# Patient Record
Sex: FEMALE | Race: WHITE | NOT HISPANIC OR LATINO
[De-identification: names, ages, dates, MRNs, and addresses within clinical notes are randomized per-mention and may not be internally consistent; named-entity substitution may affect disease eponyms.]

---

## 2021-02-16 PROBLEM — Z00.00 ENCOUNTER FOR PREVENTIVE HEALTH EXAMINATION: Status: ACTIVE | Noted: 2021-02-16

## 2021-02-17 ENCOUNTER — APPOINTMENT (OUTPATIENT)
Dept: INFECTIOUS DISEASE | Facility: CLINIC | Age: 66
End: 2021-02-17
Payer: MEDICARE

## 2021-02-17 VITALS
OXYGEN SATURATION: 97 % | DIASTOLIC BLOOD PRESSURE: 82 MMHG | TEMPERATURE: 98.8 F | SYSTOLIC BLOOD PRESSURE: 140 MMHG | HEART RATE: 84 BPM

## 2021-02-17 DIAGNOSIS — Z87.01 PERSONAL HISTORY OF PNEUMONIA (RECURRENT): ICD-10-CM

## 2021-02-17 DIAGNOSIS — Z87.19 PERSONAL HISTORY OF OTHER DISEASES OF THE DIGESTIVE SYSTEM: ICD-10-CM

## 2021-02-17 DIAGNOSIS — Z86.59 PERSONAL HISTORY OF OTHER MENTAL AND BEHAVIORAL DISORDERS: ICD-10-CM

## 2021-02-17 DIAGNOSIS — F43.23 ADJUSTMENT DISORDER WITH MIXED ANXIETY AND DEPRESSED MOOD: ICD-10-CM

## 2021-02-17 DIAGNOSIS — Z88.9 ALLERGY STATUS TO UNSPECIFIED DRUGS, MEDICAMENTS AND BIOLOGICAL SUBSTANCES: ICD-10-CM

## 2021-02-17 DIAGNOSIS — M21.612 BUNION OF LEFT FOOT: ICD-10-CM

## 2021-02-17 DIAGNOSIS — Z86.2 PERSONAL HISTORY OF DISEASES OF THE BLOOD AND BLOOD-FORMING ORGANS AND CERTAIN DISORDERS INVOLVING THE IMMUNE MECHANISM: ICD-10-CM

## 2021-02-17 DIAGNOSIS — Z86.79 PERSONAL HISTORY OF OTHER DISEASES OF THE CIRCULATORY SYSTEM: ICD-10-CM

## 2021-02-17 DIAGNOSIS — Z86.010 PERSONAL HISTORY OF COLONIC POLYPS: ICD-10-CM

## 2021-02-17 DIAGNOSIS — Z85.828 PERSONAL HISTORY OF OTHER MALIGNANT NEOPLASM OF SKIN: ICD-10-CM

## 2021-02-17 DIAGNOSIS — Z87.39 PERSONAL HISTORY OF OTHER DISEASES OF THE MUSCULOSKELETAL SYSTEM AND CONNECTIVE TISSUE: ICD-10-CM

## 2021-02-17 DIAGNOSIS — Z85.820 PERSONAL HISTORY OF MALIGNANT MELANOMA OF SKIN: ICD-10-CM

## 2021-02-17 DIAGNOSIS — Z86.69 PERSONAL HISTORY OF OTHER DISEASES OF THE NERVOUS SYSTEM AND SENSE ORGANS: ICD-10-CM

## 2021-02-17 PROCEDURE — 99072 ADDL SUPL MATRL&STAF TM PHE: CPT

## 2021-02-17 PROCEDURE — 99214 OFFICE O/P EST MOD 30 MIN: CPT

## 2021-02-17 RX ORDER — UBIDECARENONE/VIT E ACET 100MG-5
CAPSULE ORAL
Refills: 0 | Status: ACTIVE | COMMUNITY

## 2021-02-17 RX ORDER — ESCITALOPRAM OXALATE 20 MG/1
TABLET ORAL
Refills: 0 | Status: ACTIVE | COMMUNITY

## 2021-02-17 RX ORDER — AMLODIPINE BESYLATE 5 MG/1
TABLET ORAL
Refills: 0 | Status: ACTIVE | COMMUNITY

## 2021-02-17 RX ORDER — PANTOPRAZOLE SODIUM 20 MG/1
20 TABLET, DELAYED RELEASE ORAL
Refills: 0 | Status: ACTIVE | COMMUNITY

## 2021-02-17 NOTE — ASSESSMENT
[FreeTextEntry1] : 66 y/o woman with h/o HTN, GERD,  recurrent diverticulitis , s/p recent admission with third bout of recurrent diverticulitis with microperforation \par She has multiple Abx allergies - hives ad rash - including PCN, Vanco, flagyl, Cephalosporins \par Clinically improved on Ertapenem -- no new complaints \par s/p second dose of COVID vaccine yesterday \par no leukocytosis \par \par REC\par \par 1. Continue Invanz 1g daily x 2 days more through 2/19 then DC PICC line \par 2. Cont low fiber diet \par 3. GI follow up - has an appointment in 2 weeks - due to get a colonoscopy in the near future\par 4. Surgery follow up with Dr. Biswas - d/w patient she will likely need an elective partial colon resection \par 5. follow up prn \par \par plan above d/w patient at length and quesitons answered \par  [Treatment Education] : treatment education [Treatment Adherence] : treatment adherence [Drug Interactions / Side Effects] : drug interactions/side effects

## 2021-02-17 NOTE — PHYSICAL EXAM
[General Appearance - Alert] : alert [General Appearance - In No Acute Distress] : in no acute distress [Sclera] : the sclera and conjunctiva were normal [PERRL With Normal Accommodation] : pupils were equal in size, round, reactive to light [Extraocular Movements] : extraocular movements were intact [Outer Ear] : the ears and nose were normal in appearance [Oropharynx] : the oropharynx was normal with no thrush [Auscultation Breath Sounds / Voice Sounds] : lungs were clear to auscultation bilaterally [Heart Rate And Rhythm] : heart rate was normal and rhythm regular [Heart Sounds] : normal S1 and S2 [Heart Sounds Gallop] : no gallops [Murmurs] : no murmurs [Heart Sounds Pericardial Friction Rub] : no pericardial rub [Edema] : there was no peripheral edema [FreeTextEntry1] : RUE  PICC line - no erythema or edema, no tenderness  [Bowel Sounds] : normal bowel sounds [Abdomen Soft] : soft [Abdomen Tenderness] : non-tender [Costovertebral Angle Tenderness] : no CVA tenderness [Musculoskeletal - Swelling] : no joint swelling [Motor Tone] : muscle strength and tone were normal [] : no rash [Skin Color & Pigmentation] : normal skin color and pigmentation [Cranial Nerves] : cranial nerves 2-12 were intact [Motor Exam] : the motor exam was normal [No Focal Deficits] : no focal deficits [Oriented To Time, Place, And Person] : oriented to person, place, and time [Affect] : the affect was normal

## 2021-02-17 NOTE — HISTORY OF PRESENT ILLNESS
[FreeTextEntry1] : # 8 Ertapenem \par \par Patient here for follow up - \par goes to [facility] for IV Abx infusion \par feel better \par abdominal pain resolved \par eating low fiber diet \par no fever or chills\par \par received her second COVID vaccine dose yesterday - no complaints \par \par

## 2021-02-17 NOTE — REASON FOR VISIT
[Post Hospitalization] : a post hospitalization visit [FreeTextEntry1] : Pt last seen at [facility] for Diverticulitis , released on Invanz @ [facility].

## 2022-05-09 ENCOUNTER — APPOINTMENT (OUTPATIENT)
Dept: PULMONOLOGY | Facility: CLINIC | Age: 67
End: 2022-05-09
Payer: MEDICARE

## 2022-05-09 VITALS
TEMPERATURE: 97.5 F | DIASTOLIC BLOOD PRESSURE: 84 MMHG | HEART RATE: 94 BPM | HEIGHT: 64 IN | OXYGEN SATURATION: 97 % | SYSTOLIC BLOOD PRESSURE: 122 MMHG | BODY MASS INDEX: 31.76 KG/M2 | WEIGHT: 186 LBS

## 2022-05-09 DIAGNOSIS — K57.32 DIVERTICULITIS OF LARGE INTESTINE W/OUT PERFORATION OR ABSCESS W/OUT BLEEDING: ICD-10-CM

## 2022-05-09 PROCEDURE — 99204 OFFICE O/P NEW MOD 45 MIN: CPT

## 2022-05-09 RX ORDER — DOXYCYCLINE HYCLATE 100 MG/1
100 CAPSULE ORAL
Qty: 14 | Refills: 0 | Status: COMPLETED | COMMUNITY
Start: 2022-04-24 | End: 2022-05-09

## 2022-05-09 RX ORDER — AZITHROMYCIN 250 MG/1
250 TABLET, FILM COATED ORAL
Qty: 6 | Refills: 0 | Status: COMPLETED | COMMUNITY
Start: 2022-05-02

## 2022-05-09 RX ORDER — MUPIROCIN 20 MG/G
2 OINTMENT TOPICAL
Qty: 22 | Refills: 0 | Status: ACTIVE | COMMUNITY
Start: 2022-02-04

## 2022-05-09 RX ORDER — DOXYCYCLINE HYCLATE 100 MG/1
100 TABLET ORAL
Qty: 14 | Refills: 0 | Status: COMPLETED | COMMUNITY
Start: 2022-04-04 | End: 2022-05-09

## 2022-05-09 RX ORDER — MOXIFLOXACIN HYDROCHLORIDE TABLETS, 400 MG 400 MG/1
400 TABLET, FILM COATED ORAL
Qty: 7 | Refills: 0 | Status: COMPLETED | COMMUNITY
Start: 2022-03-01

## 2022-05-09 NOTE — REASON FOR VISIT
[Initial] : an initial visit [Chest Pain] : chest pain [Asthma] : asthma [Cough] : cough [Shortness of Breath] : shortness of breath [Wheezing] : wheezing [Sarcoidosis] : sarcoidosis [TextBox_44] : Bronchitis

## 2022-05-09 NOTE — ASSESSMENT
[FreeTextEntry1] : 5\par 2022 Marsha Sadler has had an illness for over 1 months, the patient's symptoms developed while she was on vacation with his son and both came down with similar illnesses.  Patient's son is also still ill but has been faring slightly better than Mrs. Sadler.  The patient's is most concerned that she still coughing and the coughing keeps her from sleeping and without sleep she does not function well.  The patient was given 1 COVID test at the beginning of this illness has not been repeated.  She had a chest x-ray she had blood counts and has been placed on multiple antibiotics and steroids.  At this time the patient is also on cough suppression including's codeine syrup.  This could still be COVID and I have ordered a nucleocapsid antibody test, CBC and a CAT scan of the chest to see if the patient has infiltrates consistent with healing COVID.  Patient will also be placed on a higher dose of steroids starting at 40 mg a day for 4 days.  Pending the results of these tests further recommendations will be made thank you time spent 45 minutes counseling and educating extensive review of history physical exam and history follow-up 1 week

## 2022-05-09 NOTE — REVIEW OF SYSTEMS
[Fever] : fever [Postnasal Drip] : postnasal drip [Cough] : cough [Back Pain] : back pain [Negative] : Hematologic [Seasonal Allergies] : no seasonal allergies [GERD] : no gerd [Headache] : no headache [Dizziness] : no dizziness [Numbness] : no numbness [Memory Loss] : no memory loss [Tremor] : no tremor [Anxiety] : no anxiety [Thyroid Problem] : no thyroid problem [Obesity] : no obesity [TextBox_3] : felt fever but did not Temp  [TextBox_30] : clear [TextBox_69] : recurrent Diverticulosis

## 2022-05-09 NOTE — PHYSICAL EXAM
[No Acute Distress] : no acute distress [Normal Oropharynx] : normal oropharynx [Normal Appearance] : normal appearance [No Neck Mass] : no neck mass [Normal Rate/Rhythm] : normal rate/rhythm [Normal S1, S2] : normal s1, s2 [No Murmurs] : no murmurs [No Resp Distress] : no resp distress [No Abnormalities] : no abnormalities [Benign] : benign [Normal Gait] : normal gait [No Clubbing] : no clubbing [No Cyanosis] : no cyanosis [No Edema] : no edema [FROM] : FROM [Normal Color/ Pigmentation] : normal color/ pigmentation [No Focal Deficits] : no focal deficits [Oriented x3] : oriented x3 [Normal Affect] : normal affect [TextBox_2] : overweight obese 31 [TextBox_54] : HTN   [TextBox_68] : some crackles and wheeze

## 2022-05-09 NOTE — HISTORY OF PRESENT ILLNESS
[Never] : never [TextBox_4] :  \par 5/9/2022 Froylan Sadler is a 66-year-old female with a approximately 3-week illness.  The patient [place].  The initial symptoms were fever and a cough fatigue.  The patient was treated over the time.  With Z-Pavel, doycycline, Biaxin,  steroids antibiotics Tessalon Perles without any diminution of her symptoms. She has developed a GYN yeast infection  She still coughing and she still has pain in her ribs from coughing and has clear sputum production.  The coughing and the other symptoms have caused her not to be able to sleep not to lie down and she tries to sleep sitting up.  The patient had sarcoidosis but was told some years ago that it had all remitted.  She sees Dr. Rakesh Carlton regularly for diverticulitis and called for his help after she started to develop GI symptoms.  The symptoms were felt to be due to the antibiotics used to treat her prior illness she did have a [facility]

## 2022-05-17 ENCOUNTER — APPOINTMENT (OUTPATIENT)
Dept: PULMONOLOGY | Facility: CLINIC | Age: 67
End: 2022-05-17
Payer: MEDICARE

## 2022-05-17 VITALS
HEART RATE: 89 BPM | TEMPERATURE: 97.3 F | OXYGEN SATURATION: 96 % | DIASTOLIC BLOOD PRESSURE: 80 MMHG | SYSTOLIC BLOOD PRESSURE: 136 MMHG

## 2022-05-17 PROCEDURE — 99214 OFFICE O/P EST MOD 30 MIN: CPT

## 2022-05-17 NOTE — REASON FOR VISIT
[Follow-Up] : a follow-up visit [Asthma] : asthma [Cough] : cough [Shortness of Breath] : shortness of breath [Wheezing] : wheezing [Sarcoidosis] : sarcoidosis [TextBox_44] : 2 weeks, sore throat

## 2022-05-17 NOTE — HISTORY OF PRESENT ILLNESS
[Never] : never [TextBox_4] :  \par 5/9/2022 Froylan Sadler is a 66-year-old female with a approximately 3-week illness.  The patient [place].  The initial symptoms were fever and a cough fatigue.  The patient was treated over the time.  With Z-Pavel, doycycline, Biaxin,  steroids antibiotics Tessalon Perles without any diminution of her symptoms. She has developed a GYN yeast infection  She still coughing and she still has pain in her ribs from coughing and has clear sputum production.  The coughing and the other symptoms have caused her not to be able to sleep not to lie down and she tries to sleep sitting up.  The patient had sarcoidosis but was told some years ago that it had all remitted.  She sees Dr. Rakesh Carlton regularly for diverticulitis and called for his help after she started to develop GI symptoms.  The symptoms were felt to be due to the antibiotics used to treat her prior illness she did have a negative PCR COVID test at [facility]. \par \par 5/16/2022 Marsha Sadler is a 66-year-old female who has improved on the prednisone and Symbicort inhaler.  Patient is much less distressed is getting more sleep.  She is tolerating the medications.  She did report some heaviness in her throat.  On further questioning she is not been rinsing gargling after using the Symbicort and was advised to do so

## 2022-05-17 NOTE — PHYSICAL EXAM
[No Acute Distress] : no acute distress [Normal Oropharynx] : normal oropharynx [Normal Appearance] : normal appearance [No Neck Mass] : no neck mass [Normal Rate/Rhythm] : normal rate/rhythm [Normal S1, S2] : normal s1, s2 [No Murmurs] : no murmurs [No Resp Distress] : no resp distress [Clear to Auscultation Bilaterally] : clear to auscultation bilaterally [No Abnormalities] : no abnormalities [Benign] : benign [Normal Gait] : normal gait [No Clubbing] : no clubbing [No Cyanosis] : no cyanosis [No Edema] : no edema [FROM] : FROM [Normal Color/ Pigmentation] : normal color/ pigmentation [No Focal Deficits] : no focal deficits [Oriented x3] : oriented x3 [Normal Affect] : normal affect [TextBox_2] : overweight obese 31 [TextBox_54] : HTN

## 2022-05-17 NOTE — ASSESSMENT
[FreeTextEntry1] : 5\par 2022 Marsha Sadler has had an illness for over 1 months, the patient's symptoms developed while she was on vacation with his son and both came down with similar illnesses.  Patient's son is also still ill but has been faring slightly better than Mrs. Sadler.  The patient's is most concerned that she still coughing and the coughing keeps her from sleeping and without sleep she does not function well.  The patient was given 1 COVID test at the beginning of this illness has not been repeated.  She had a chest x-ray she had blood counts and has been placed on multiple antibiotics and steroids.  At this time the patient is also on cough suppression including's codeine syrup.  This could still be COVID and I have ordered a nucleocapsid antibody test, CBC and a CAT scan of the chest to see if the patient has infiltrates consistent with healing COVID.  Patient will also be placed on a higher dose of steroids starting at 40 mg a day for 4 days.  Pending the results of these tests further recommendations will be made thank you time spent 45 minutes counseling and educating extensive review of history physical exam and history follow-up 1 week\par \par 5/16/2022 Froylan Sadler has returned after 1 week of therapy and has much improved.  She still has some residual raspiness and cut off of her breathing after talking or with deep breaths.  She is tolerating the Symbicort inhaler and is finishing up the prednisone today.  Patient was told to call if her symptoms and increase off the prednisone.  Patient was told to rinse each time after using the Symbicort and not to stop the Symbicort.  30 minutes counseling educating physical exam history follow-up 1 month

## 2022-06-14 ENCOUNTER — APPOINTMENT (OUTPATIENT)
Dept: PULMONOLOGY | Facility: CLINIC | Age: 67
End: 2022-06-14
Payer: MEDICARE

## 2022-06-14 VITALS
HEIGHT: 64 IN | WEIGHT: 188 LBS | TEMPERATURE: 97.2 F | HEART RATE: 82 BPM | SYSTOLIC BLOOD PRESSURE: 130 MMHG | OXYGEN SATURATION: 97 % | BODY MASS INDEX: 32.1 KG/M2 | DIASTOLIC BLOOD PRESSURE: 70 MMHG

## 2022-06-14 PROCEDURE — 99214 OFFICE O/P EST MOD 30 MIN: CPT

## 2022-06-14 NOTE — HISTORY OF PRESENT ILLNESS
[Never] : never [TextBox_4] :  \par 5/9/2022 Froylan Sadler is a 66-year-old female with a approximately 3-week illness.  The patient [place].  The initial symptoms were fever and a cough fatigue.  The patient was treated over the time.  With Z-Pavel, doycycline, Biaxin,  steroids antibiotics Tessalon Perles without any diminution of her symptoms. She has developed a GYN yeast infection  She still coughing and she still has pain in her ribs from coughing and has clear sputum production.  The coughing and the other symptoms have caused her not to be able to sleep not to lie down and she tries to sleep sitting up.  The patient had sarcoidosis but was told some years ago that it had all remitted.  She sees Dr. Rakesh Carlton regularly for diverticulitis and called for his help after she started to develop GI symptoms.  The symptoms were felt to be due to the antibiotics used to treat her prior illness she did have a negative PCR COVID test at [facility]. \par \par 5/16/2022 Marsha Sadler is a 66-year-old female who has improved on the prednisone and Symbicort inhaler.  Patient is much less distressed is getting more sleep.  She is tolerating the medications.  She did report some heaviness in her throat.  On further questioning she is not been rinsing gargling after using the Symbicort and was advised to do so

## 2022-06-14 NOTE — REVIEW OF SYSTEMS
[Fever] : no fever [Postnasal Drip] : postnasal drip [Cough] : cough [Chest Tightness] : chest tightness [Seasonal Allergies] : no seasonal allergies [GERD] : no gerd [Back Pain] : back pain [Headache] : no headache [Dizziness] : no dizziness [Numbness] : no numbness [Memory Loss] : no memory loss [Tremor] : no tremor [Anxiety] : no anxiety [Thyroid Problem] : no thyroid problem [Obesity] : no obesity [Negative] : Neurologic [TextBox_30] : no cough at this tiome [TextBox_69] : recurrent Diverticulosis

## 2022-06-14 NOTE — REASON FOR VISIT
[Follow-Up] : a follow-up visit [Asthma] : asthma [Cough] : cough [Shortness of Breath] : shortness of breath [Wheezing] : wheezing [Sarcoidosis] : sarcoidosis [TextBox_44] : 1 month,

## 2022-06-14 NOTE — ASSESSMENT
[FreeTextEntry1] : 5\par 2022 Marsha Sadler has had an illness for over 1 months, the patient's symptoms developed while she was on vacation with his son and both came down with similar illnesses.  Patient's son is also still ill but has been faring slightly better than Mrs. aSdler.  The patient's is most concerned that she still coughing and the coughing keeps her from sleeping and without sleep she does not function well.  The patient was given 1 COVID test at the beginning of this illness has not been repeated.  She had a chest x-ray she had blood counts and has been placed on multiple antibiotics and steroids.  At this time the patient is also on cough suppression including's codeine syrup.  This could still be COVID and I have ordered a nucleocapsid antibody test, CBC and a CAT scan of the chest to see if the patient has infiltrates consistent with healing COVID.  Patient will also be placed on a higher dose of steroids starting at 40 mg a day for 4 days.  Pending the results of these tests further recommendations will be made thank you time spent 45 minutes counseling and educating extensive review of history physical exam and history follow-up 1 week\par \par 5/16/2022 Froylan Sadler has returned after 1 week of therapy and has much improved.  She still has some residual raspiness and cut off of her breathing after talking or with deep breaths.  She is tolerating the Symbicort inhaler and is finishing up the prednisone today.  Patient was told to call if her symptoms and increase off the prednisone.  Patient was told to rinse each time after using the Symbicort and not to stop the Symbicort.  30 minutes counseling educating physical exam history follow-up 1 month\par \par 6/14/22 The patient is doing well and is tolerating the medication  She will continue the medication

## 2022-06-14 NOTE — PHYSICAL EXAM
[No Acute Distress] : no acute distress [Normal Oropharynx] : normal oropharynx [Normal Appearance] : normal appearance [No Neck Mass] : no neck mass [Normal Rate/Rhythm] : normal rate/rhythm [Normal S1, S2] : normal s1, s2 [No Murmurs] : no murmurs [No Resp Distress] : no resp distress [Clear to Auscultation Bilaterally] : clear to auscultation bilaterally [No Abnormalities] : no abnormalities [Benign] : benign [Normal Gait] : normal gait [No Clubbing] : no clubbing [No Cyanosis] : no cyanosis [No Edema] : no edema [FROM] : FROM [Normal Color/ Pigmentation] : normal color/ pigmentation [No Focal Deficits] : no focal deficits [Oriented x3] : oriented x3 [Normal Affect] : normal affect [TextBox_2] : overweight obese 32 [TextBox_54] : HTN

## 2022-10-14 ENCOUNTER — APPOINTMENT (OUTPATIENT)
Dept: PULMONOLOGY | Facility: CLINIC | Age: 67
End: 2022-10-14

## 2022-10-14 ENCOUNTER — NON-APPOINTMENT (OUTPATIENT)
Age: 67
End: 2022-10-14

## 2022-10-14 VITALS
DIASTOLIC BLOOD PRESSURE: 72 MMHG | TEMPERATURE: 98 F | SYSTOLIC BLOOD PRESSURE: 132 MMHG | HEIGHT: 64 IN | OXYGEN SATURATION: 97 % | WEIGHT: 188 LBS | BODY MASS INDEX: 32.1 KG/M2 | HEART RATE: 73 BPM

## 2022-10-14 PROCEDURE — 94010 BREATHING CAPACITY TEST: CPT

## 2022-10-14 PROCEDURE — 99214 OFFICE O/P EST MOD 30 MIN: CPT | Mod: 25

## 2022-10-14 RX ORDER — GUAIFENESIN AND CODEINE PHOSPHATE 10; 100 MG/5ML; MG/5ML
100-10 LIQUID ORAL
Qty: 180 | Refills: 0 | Status: DISCONTINUED | COMMUNITY
Start: 2022-05-02 | End: 2022-10-14

## 2022-10-14 RX ORDER — PREDNISONE 20 MG/1
20 TABLET ORAL
Qty: 10 | Refills: 0 | Status: DISCONTINUED | COMMUNITY
Start: 2022-04-24 | End: 2022-10-14

## 2022-10-14 RX ORDER — BENZONATATE 200 MG/1
200 CAPSULE ORAL
Qty: 21 | Refills: 0 | Status: DISCONTINUED | COMMUNITY
Start: 2022-05-06 | End: 2022-10-14

## 2022-10-14 RX ORDER — GABAPENTIN 300 MG/1
300 CAPSULE ORAL
Qty: 60 | Refills: 0 | Status: DISCONTINUED | COMMUNITY
Start: 2022-03-07 | End: 2022-10-14

## 2022-10-14 RX ORDER — ACETAZOLAMIDE 500 MG/1
500 CAPSULE ORAL
Qty: 12 | Refills: 0 | Status: DISCONTINUED | COMMUNITY
Start: 2022-02-08 | End: 2022-10-14

## 2022-10-14 RX ORDER — PREDNISONE 10 MG/1
10 TABLET ORAL
Qty: 30 | Refills: 1 | Status: DISCONTINUED | COMMUNITY
Start: 2022-05-09 | End: 2022-10-14

## 2022-10-14 RX ORDER — PANTOPRAZOLE 40 MG/1
40 TABLET, DELAYED RELEASE ORAL
Qty: 90 | Refills: 0 | Status: DISCONTINUED | COMMUNITY
Start: 2022-04-26 | End: 2022-10-14

## 2022-10-14 RX ORDER — GABAPENTIN 100 MG/1
100 CAPSULE ORAL
Qty: 90 | Refills: 0 | Status: DISCONTINUED | COMMUNITY
Start: 2022-03-14 | End: 2022-10-14

## 2022-10-14 NOTE — ASSESSMENT
[FreeTextEntry1] : 5\par 2022 Marsha Sadler has had an illness for over 1 months, the patient's symptoms developed while she was on vacation with his son and both came down with similar illnesses.  Patient's son is also still ill but has been faring slightly better than Mrs. Sadler.  The patient's is most concerned that she still coughing and the coughing keeps her from sleeping and without sleep she does not function well.  The patient was given 1 COVID test at the beginning of this illness has not been repeated.  She had a chest x-ray she had blood counts and has been placed on multiple antibiotics and steroids.  At this time the patient is also on cough suppression including's codeine syrup.  This could still be COVID and I have ordered a nucleocapsid antibody test, CBC and a CAT scan of the chest to see if the patient has infiltrates consistent with healing COVID.  Patient will also be placed on a higher dose of steroids starting at 40 mg a day for 4 days.  Pending the results of these tests further recommendations will be made thank you time spent 45 minutes counseling and educating extensive review of history physical exam and history follow-up 1 week\par \par 5/16/2022 Froylan Sadler has returned after 1 week of therapy and has much improved.  She still has some residual raspiness and cut off of her breathing after talking or with deep breaths.  She is tolerating the Symbicort inhaler and is finishing up the prednisone today.  Patient was told to call if her symptoms and increase off the prednisone.  Patient was told to rinse each time after using the Symbicort and not to stop the Symbicort.  30 minutes counseling educating physical exam history follow-up 1 month\par \par 6/14/22 The patient is doing well and is tolerating the medication  She will continue the medication  \par \par \par 10/14/22 The patient is without any complaint. The patient functions with in certain limited excrise limitations and does not feel comfortable extending her limits. Her most concerning limit is her obesity and we spoke extensively about it and that it is a family issue with her  and daughter also seriously overweight     The patient agreed to speak with the med weight management team of [facility].   Her spirometry is normal   time spent 30 minutes counseling, education, documentation, imaging reviewed, medication reviewed, , old records reviewed, HX and PE

## 2022-10-14 NOTE — PHYSICAL EXAM
[No Acute Distress] : no acute distress [Normal Oropharynx] : normal oropharynx [Normal Appearance] : normal appearance [No Neck Mass] : no neck mass [Normal Rate/Rhythm] : normal rate/rhythm [Normal S1, S2] : normal s1, s2 [No Murmurs] : no murmurs [No Resp Distress] : no resp distress [Clear to Auscultation Bilaterally] : clear to auscultation bilaterally [No Abnormalities] : no abnormalities [Benign] : benign [Normal Gait] : normal gait [No Clubbing] : no clubbing [No Cyanosis] : no cyanosis [No Edema] : no edema [FROM] : FROM [Normal Color/ Pigmentation] : normal color/ pigmentation [No Focal Deficits] : no focal deficits [Oriented x3] : oriented x3 [Normal Affect] : normal affect [TextBox_2] : obese BmI  32 [TextBox_54] : HTN

## 2022-10-14 NOTE — HISTORY OF PRESENT ILLNESS
[Never] : never [TextBox_4] :  \par 5/9/2022 Froylan Sadler is a 66-year-old female with a approximately 3-week illness.  The patient [place].  The initial symptoms were fever and a cough fatigue.  The patient was treated over the time.  With Z-Pavel, doycycline, Biaxin,  steroids antibiotics Tessalon Perles without any diminution of her symptoms. She has developed a GYN yeast infection  She still coughing and she still has pain in her ribs from coughing and has clear sputum production.  The coughing and the other symptoms have caused her not to be able to sleep not to lie down and she tries to sleep sitting up.  The patient had sarcoidosis but was told some years ago that it had all remitted.  She sees Dr. Rakesh Carlton regularly for diverticulitis and called for his help after she started to develop GI symptoms.  The symptoms were felt to be due to the antibiotics used to treat her prior illness she did have a negative PCR COVID test at [facility]. \par \par 5/16/2022 Marsha Sadler is a 66-year-old female who has improved on the prednisone and Symbicort inhaler.  Patient is much less distressed is getting more sleep.  She is tolerating the medications.  She did report some heaviness in her throat.  On further questioning she is not been rinsing gargling after using the Symbicort and was advised to do so\par \par 10/14/22  The patient is doing well  and has been on vacation recently to [facility]    She has had no acute episodes of dyspnea She has not been able to lose weight

## 2022-10-14 NOTE — REVIEW OF SYSTEMS
[Postnasal Drip] : postnasal drip [Cough] : cough [Chest Tightness] : chest tightness [Back Pain] : back pain [Negative] : Neurologic [Fever] : no fever [Seasonal Allergies] : no seasonal allergies [GERD] : no gerd [Headache] : no headache [Dizziness] : no dizziness [Numbness] : no numbness [Memory Loss] : no memory loss [Tremor] : no tremor [Anxiety] : no anxiety [Thyroid Problem] : no thyroid problem [Obesity] : no obesity [TextBox_30] : no cough at this time [TextBox_69] : recurrent Diverticulosis

## 2022-10-14 NOTE — REASON FOR VISIT
[Follow-Up] : a follow-up visit [Asthma] : asthma [Sarcoidosis] : sarcoidosis [TextBox_44] : 4 months. Pt

## 2023-04-14 ENCOUNTER — APPOINTMENT (OUTPATIENT)
Dept: PULMONOLOGY | Facility: CLINIC | Age: 68
End: 2023-04-14
Payer: MEDICARE

## 2023-04-14 VITALS
DIASTOLIC BLOOD PRESSURE: 60 MMHG | WEIGHT: 190 LBS | OXYGEN SATURATION: 95 % | HEART RATE: 85 BPM | TEMPERATURE: 97.5 F | HEIGHT: 64 IN | SYSTOLIC BLOOD PRESSURE: 112 MMHG | BODY MASS INDEX: 32.44 KG/M2

## 2023-04-14 PROCEDURE — 99214 OFFICE O/P EST MOD 30 MIN: CPT

## 2023-04-14 RX ORDER — ALBUTEROL SULFATE 90 UG/1
108 (90 BASE) INHALANT RESPIRATORY (INHALATION) EVERY 6 HOURS
Qty: 1 | Refills: 6 | Status: ACTIVE | COMMUNITY
Start: 2022-04-24

## 2023-04-14 RX ORDER — FAMOTIDINE 10 MG/1
TABLET, FILM COATED ORAL
Refills: 0 | Status: DISCONTINUED | COMMUNITY
End: 2023-04-14

## 2023-04-14 RX ORDER — BUDESONIDE AND FORMOTEROL FUMARATE DIHYDRATE 80; 4.5 UG/1; UG/1
80-4.5 AEROSOL RESPIRATORY (INHALATION) TWICE DAILY
Qty: 1 | Refills: 6 | Status: ACTIVE | COMMUNITY
Start: 2023-04-14 | End: 1900-01-01

## 2023-04-14 RX ORDER — AZELASTINE HYDROCHLORIDE 137 UG/1
0.1 SPRAY, METERED NASAL
Qty: 30 | Refills: 0 | Status: DISCONTINUED | COMMUNITY
Start: 2022-01-03 | End: 2023-04-14

## 2023-04-14 RX ORDER — ERTAPENEM SODIUM 1 G/1
1 INJECTION, POWDER, LYOPHILIZED, FOR SOLUTION INTRAVENOUS
Refills: 0 | Status: DISCONTINUED | COMMUNITY
End: 2023-04-14

## 2023-04-14 RX ORDER — BACILLUS COAGULANS/INULIN 1B-250 MG
CAPSULE ORAL
Refills: 0 | Status: ACTIVE | COMMUNITY

## 2023-04-14 RX ORDER — BUDESONIDE AND FORMOTEROL FUMARATE DIHYDRATE 160; 4.5 UG/1; UG/1
160-4.5 AEROSOL RESPIRATORY (INHALATION)
Qty: 1 | Refills: 6 | Status: DISCONTINUED | COMMUNITY
Start: 2022-05-06 | End: 2023-04-14

## 2023-04-14 NOTE — HISTORY OF PRESENT ILLNESS
[TextBox_4] :  \par 5/9/2022 Froylan Sadler is a 66-year-old female with a approximately 3-week illness.  The patient [place].  The initial symptoms were fever and a cough fatigue.  The patient was treated over the time.  With Z-Pavel, doycycline, Biaxin,  steroids antibiotics Tessalon Perles without any diminution of her symptoms. She has developed a GYN yeast infection  She still coughing and she still has pain in her ribs from coughing and has clear sputum production.  The coughing and the other symptoms have caused her not to be able to sleep not to lie down and she tries to sleep sitting up.  The patient had sarcoidosis but was told some years ago that it had all remitted.  She sees Dr. Rakesh Carlton regularly for diverticulitis and called for his help after she started to develop GI symptoms.  The symptoms were felt to be due to the antibiotics used to treat her prior illness she did have a negative PCR COVID test at [facility]. \par \par 5/16/2022 Marsha Sadler is a 66-year-old female who has improved on the prednisone and Symbicort inhaler.  Patient is much less distressed is getting more sleep.  She is tolerating the medications.  She did report some heaviness in her throat.  On further questioning she is not been rinsing gargling after using the Symbicort and was advised to do so\par \par 10/14/22  The patient is doing well  and has been on vacation recently to [facility]    She has had no acute episodes of dyspnea She has not been able to lose weight  \par \par 4/14//23  the patient is doing well and has  no new sx  She is concerned about her inhalers

## 2023-04-14 NOTE — ASSESSMENT
[FreeTextEntry1] : 5\par 2022 Marsha Sadler has had an illness for over 1 months, the patient's symptoms developed while she was on vacation with his son and both came down with similar illnesses.  Patient's son is also still ill but has been faring slightly better than Mrs. Sadler.  The patient's is most concerned that she still coughing and the coughing keeps her from sleeping and without sleep she does not function well.  The patient was given 1 COVID test at the beginning of this illness has not been repeated.  She had a chest x-ray she had blood counts and has been placed on multiple antibiotics and steroids.  At this time the patient is also on cough suppression including's codeine syrup.  This could still be COVID and I have ordered a nucleocapsid antibody test, CBC and a CAT scan of the chest to see if the patient has infiltrates consistent with healing COVID.  Patient will also be placed on a higher dose of steroids starting at 40 mg a day for 4 days.  Pending the results of these tests further recommendations will be made thank you time spent 45 minutes counseling and educating extensive review of history physical exam and history follow-up 1 week\par \par 5/16/2022 Froylan Sadler has returned after 1 week of therapy and has much improved.  She still has some residual raspiness and cut off of her breathing after talking or with deep breaths.  She is tolerating the Symbicort inhaler and is finishing up the prednisone today.  Patient was told to call if her symptoms and increase off the prednisone.  Patient was told to rinse each time after using the Symbicort and not to stop the Symbicort.  30 minutes counseling educating physical exam history follow-up 1 month\par \par 6/14/22 The patient is doing well and is tolerating the medication  She will continue the medication  \par \par \par 10/14/22 The patient is without any complaint. The patient functions with in certain limited excrise limitations and does not feel comfortable extending her limits. Her most concerning limit is her obesity and we spoke extensively about it and that it is a family issue with her  and daughter also seriously overweight     The patient agreed to speak with the med weight management team of [facility].   Her spirometry is normal   time spent 30 minutes counseling, education, documentation, imaging reviewed, medication reviewed, , old records reviewed, HX and PE  \par \par 4/14/23   the patient and i had an extensive discussions' about  her and her family's  seight problem   will order weight management refferal  and also her resp meds    time spent 30min  counseling, education, documentation, imaging reviewed, medication reviewed, old records reviewed, HX and PE

## 2023-04-14 NOTE — REASON FOR VISIT
[Asthma] : asthma [Sarcoidosis] : sarcoidosis [TextBox_44] : 6 months. Pt reports no pulmonary issues at this time. Pt would like to talk regarding her inhalers.

## 2023-07-21 ENCOUNTER — RX ONLY (RX ONLY)
Age: 68
End: 2023-07-21

## 2023-09-26 ENCOUNTER — OFFICE (OUTPATIENT)
Facility: LOCATION | Age: 68
Setting detail: OPHTHALMOLOGY
End: 2023-09-26
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
## 2023-09-26 PROCEDURE — NO SHOW FE NO SHOW FEE: Performed by: OPHTHALMOLOGY

## 2023-11-27 ENCOUNTER — APPOINTMENT (OUTPATIENT)
Dept: PULMONOLOGY | Facility: CLINIC | Age: 68
End: 2023-11-27
Payer: MEDICARE

## 2023-11-27 VITALS
DIASTOLIC BLOOD PRESSURE: 80 MMHG | HEART RATE: 74 BPM | WEIGHT: 193 LBS | TEMPERATURE: 97.2 F | SYSTOLIC BLOOD PRESSURE: 140 MMHG | OXYGEN SATURATION: 96 % | BODY MASS INDEX: 32.95 KG/M2 | HEIGHT: 64 IN

## 2023-11-27 DIAGNOSIS — J45.909 UNSPECIFIED ASTHMA, UNCOMPLICATED: ICD-10-CM

## 2023-11-27 PROCEDURE — 99214 OFFICE O/P EST MOD 30 MIN: CPT

## 2024-06-07 ENCOUNTER — APPOINTMENT (OUTPATIENT)
Dept: PULMONOLOGY | Facility: CLINIC | Age: 69
End: 2024-06-07
Payer: MEDICARE

## 2024-06-07 VITALS
OXYGEN SATURATION: 98 % | WEIGHT: 194 LBS | HEIGHT: 64 IN | DIASTOLIC BLOOD PRESSURE: 78 MMHG | BODY MASS INDEX: 33.12 KG/M2 | HEART RATE: 74 BPM | TEMPERATURE: 98.1 F | SYSTOLIC BLOOD PRESSURE: 130 MMHG

## 2024-06-07 DIAGNOSIS — E66.9 OBESITY, UNSPECIFIED: ICD-10-CM

## 2024-06-07 DIAGNOSIS — R05.9 COUGH, UNSPECIFIED: ICD-10-CM

## 2024-06-07 DIAGNOSIS — Z86.2 PERSONAL HISTORY OF DISEASES OF THE BLOOD AND BLOOD-FORMING ORGANS AND CERTAIN DISORDERS INVOLVING THE IMMUNE MECHANISM: ICD-10-CM

## 2024-06-07 PROCEDURE — 94010 BREATHING CAPACITY TEST: CPT

## 2024-06-07 PROCEDURE — 94727 GAS DIL/WSHOT DETER LNG VOL: CPT

## 2024-06-07 PROCEDURE — 94729 DIFFUSING CAPACITY: CPT

## 2024-06-07 PROCEDURE — 99213 OFFICE O/P EST LOW 20 MIN: CPT | Mod: 25

## 2024-06-07 RX ORDER — TOBRAMYCIN AND DEXAMETHASONE 3; 1 MG/ML; MG/ML
0.3-0.1 SUSPENSION/ DROPS OPHTHALMIC
Qty: 5 | Refills: 0 | Status: DISCONTINUED | COMMUNITY
Start: 2022-02-28 | End: 2024-06-07

## 2024-06-07 RX ORDER — LORATADINE 10 MG
TABLET ORAL
Refills: 0 | Status: ACTIVE | COMMUNITY

## 2024-06-07 RX ORDER — CYCLOPENTOLATE HYDROCHLORIDE 10 MG/ML
1 SOLUTION OPHTHALMIC
Qty: 2 | Refills: 0 | Status: DISCONTINUED | COMMUNITY
Start: 2022-03-05 | End: 2024-06-07

## 2024-06-17 NOTE — ASSESSMENT
[FreeTextEntry1] : 5/16/2022 Froylan Sadler has returned after 1 week of therapy and has much improved. She still has some residual raspiness and cut off of her breathing after talking or with deep breaths. She is tolerating the Symbicort inhaler and is finishing up the prednisone today. Patient was told to call if her symptoms and increase off the prednisone. Patient was told to rinse each time after using the Symbicort and not to stop the Symbicort. 30 minutes counseling educating physical exam history follow-up 1 month 6/14/22 The patient is doing well and is tolerating the medication She will continue the medication  10/14/22 The patient is without any complaint. The patient functions with in certain limited excrise limitations and does not feel comfortable extending her limits. Her most concerning limit is her obesity and we spoke extensively about it and that it is a family issue with her  and daughter also seriously overweight The patient agreed to speak with the med weight management team of [facility]. Her spirometry is normal time spent 30 minutes counseling, education, documentation, imaging reviewed, medication reviewed, , old records reviewed, HX and PE  4/14/23 the patient and i had an extensive discussions' about her and her family's seight problem will order weight management refferal and also her resp meds time spent 30min counseling, education, documentation, imaging reviewed, medication reviewed, old records reviewed, HX and PE.  11/27/2023 1) patient's pulmonary status is stable and she is not even using the Symbicort inhaler prescribed from her last visit 2) patient's weight still remains a major issue for overall health. I will put in a referral to the medical weight management of [facility]. Follow-up 6 months time spent 30 minutes counseling, education, documentation, imaging reviewed, medication reviewed, inhaler demonstrated, old records reviewed, HX and PE.

## 2024-06-17 NOTE — HISTORY OF PRESENT ILLNESS
[TextBox_4] : hasn't needed the symbicort 5/16/2022 Marsha Sadler is a 66-year-old female who has improved on the prednisone and Symbicort inhaler. Patient is much less distressed is getting more sleep. She is tolerating the medications. She did report some heaviness in her throat. On further questioning she is not been rinsing gargling after using the Symbicort and was advised to do so  10/14/22 The patient is doing well and has been on vacation recently to [facility] She has had no acute episodes of dyspnea She has not been able to lose weight 4/14//23 the patient is doing well and has no new sx She is concerned about her inhalers.  11/27/2023 patient has been doing well in the point that she has her Symbicort inhaler and the dry has not taken out in several days. The patient has been active and has not been disabled by any of the previous respiratory problems. Patient states she was never contacted by medical weight management and she has not been able to lose weight on her own.   Smoking Status: never   eCigarettes: never   \ 6/7/2024  1) has

## 2024-09-24 ENCOUNTER — NON-APPOINTMENT (OUTPATIENT)
Age: 69
End: 2024-09-24

## 2024-09-27 ENCOUNTER — APPOINTMENT (OUTPATIENT)
Dept: PULMONOLOGY | Facility: CLINIC | Age: 69
End: 2024-09-27
Payer: MEDICARE

## 2024-09-27 VITALS
OXYGEN SATURATION: 95 % | SYSTOLIC BLOOD PRESSURE: 122 MMHG | TEMPERATURE: 98.4 F | DIASTOLIC BLOOD PRESSURE: 70 MMHG | BODY MASS INDEX: 33.12 KG/M2 | HEIGHT: 64 IN | WEIGHT: 194 LBS | HEART RATE: 75 BPM

## 2024-09-27 DIAGNOSIS — J45.909 UNSPECIFIED ASTHMA, UNCOMPLICATED: ICD-10-CM

## 2024-09-27 DIAGNOSIS — Z86.2 PERSONAL HISTORY OF DISEASES OF THE BLOOD AND BLOOD-FORMING ORGANS AND CERTAIN DISORDERS INVOLVING THE IMMUNE MECHANISM: ICD-10-CM

## 2024-09-27 PROCEDURE — 99214 OFFICE O/P EST MOD 30 MIN: CPT

## 2024-09-28 ENCOUNTER — NON-APPOINTMENT (OUTPATIENT)
Age: 69
End: 2024-09-28

## 2024-09-29 NOTE — PHYSICAL EXAM
[No Acute Distress] : no acute distress [Normal Oropharynx] : normal oropharynx [Normal Appearance] : normal appearance [No Neck Mass] : no neck mass [Normal Rate/Rhythm] : normal rate/rhythm [Normal S1, S2] : normal s1, s2 [No Murmurs] : no murmurs [No Resp Distress] : no resp distress [Clear to Auscultation Bilaterally] : clear to auscultation bilaterally [No Abnormalities] : no abnormalities [Benign] : benign [Normal Gait] : normal gait [No Clubbing] : no clubbing [No Cyanosis] : no cyanosis [No Edema] : no edema [FROM] : FROM [Normal Color/ Pigmentation] : normal color/ pigmentation [No Focal Deficits] : no focal deficits [Oriented x3] : oriented x3 [Normal Affect] : normal affect [TextBox_54] : HTN   [TextBox_2] : obese BmI  30

## 2024-09-29 NOTE — ASSESSMENT
[FreeTextEntry1] : 5/16/2022 Froylan Sadler has returned after 1 week of therapy and has much improved. She still has some residual raspiness and cut off of her breathing after talking or with deep breaths. She is tolerating the Symbicort inhaler and is finishing up the prednisone today. Patient was told to call if her symptoms and increase off the prednisone. Patient was told to rinse each time after using the Symbicort and not to stop the Symbicort. 30 minutes counseling educating physical exam history follow-up 1 month 6/14/22 The patient is doing well and is tolerating the medication She will continue the medication  10/14/22 The patient is without any complaint. The patient functions with in certain limited excrise limitations and does not feel comfortable extending her limits. Her most concerning limit is her obesity and we spoke extensively about it and that it is a family issue with her  and daughter also seriously overweight The patient agreed to speak with the med weight management team of [facility]. Her spirometry is normal time spent 30 minutes counseling, education, documentation, imaging reviewed, medication reviewed, , old records reviewed, HX and PE  4/14/23 the patient and i had an extensive discussions' about her and her family's seight problem will order weight management refferal and also her resp meds time spent 30min counseling, education, documentation, imaging reviewed, medication reviewed, old records reviewed, HX and PE.  11/27/2023 1) patient's pulmonary status is stable and she is not even using the Symbicort inhaler prescribed from her last visit 2) patient's weight still remains a major issue for overall health. I will put in a referral to the medical weight management of [facility]. Follow-up 6 months time spent 30 minutes counseling, education, documentation, imaging reviewed, medication reviewed, inhaler demonstrated, old records reviewed, HX and PE.  6/7/24  1) clinically doing well does not use her inhaler on a regular basis  2) She had a PFT that was normal but at the lowest limits of normal  3) her lung function would improve if she could lose weight    discussed with the patient time spent 30 min  counseling, education, documentation, imaging reviewed, medication reviewed,  f/u 6mos   9/27/2024 Marsha Sadler is a 68-year-old female with history of recurrent bronchitis, asthma and sarcoidosis.  The patient had a CAT scan in May 2024 which described bronchiectasis in multiple lobes of her lungs.  The patient wanted an explanation about bronchiectasis is I explained that to her.  Patient was also told that it was probably from the venous bronchitis and or the sarcoidosis and that the injury to the bronchial tubes predisposes her to bronchial infections.  Time spent 30 minutes counseling, education, documentation, imaging reviewed, medication reviewed, inhaler demonstrated, old records reviewed, HX and PE

## 2024-09-29 NOTE — HISTORY OF PRESENT ILLNESS
[Never] : never [TextBox_4] : 9/27/2024 Froylan Sadler is a 68-year-old female who was seen earlier in the year in June.  The patient had a CAT scan of the chest in May and I spoke to on the phone on 5/10/2024.  I discussed the CAT scan at her visit in June.  Still has a few questions about the scan.  Patient offers no new respiratory symptoms. Patient has a history of sarcoidosis and asthma.

## 2024-09-29 NOTE — REVIEW OF SYSTEMS
[Postnasal Drip] : postnasal drip [Cough] : cough [Chest Tightness] : chest tightness [Back Pain] : back pain [Negative] : Neurologic [Fever] : no fever [Seasonal Allergies] : no seasonal allergies [GERD] : no gerd [Headache] : no headache [Dizziness] : no dizziness [Numbness] : no numbness [Memory Loss] : no memory loss [Tremor] : no tremor [Anxiety] : no anxiety [Thyroid Problem] : no thyroid problem [Obesity] : no obesity [TextBox_69] : recurrent Diverticulosis  [TextBox_30] : no cough at this time

## 2024-09-29 NOTE — ASSESSMENT
[FreeTextEntry1] : 5/16/2022 Froylan Sadler has returned after 1 week of therapy and has much improved. She still has some residual raspiness and cut off of her breathing after talking or with deep breaths. She is tolerating the Symbicort inhaler and is finishing up the prednisone today. Patient was told to call if her symptoms and increase off the prednisone. Patient was told to rinse each time after using the Symbicort and not to stop the Symbicort. 30 minutes counseling educating physical exam history follow-up 1 month 6/14/22 The patient is doing well and is tolerating the medication She will continue the medication  10/14/22 The patient is without any complaint. The patient functions with in certain limited excrise limitations and does not feel comfortable extending her limits. Her most concerning limit is her obesity and we spoke extensively about it and that it is a family issue with her  and daughter also seriously overweight The patient agreed to speak with the med weight management team of [facility]. Her spirometry is normal time spent 30 minutes counseling, education, documentation, imaging reviewed, medication reviewed, , old records reviewed, HX and PE  4/14/23 the patient and i had an extensive discussions' about her and her family's seight problem will order weight management refferal and also her resp meds time spent 30min counseling, education, documentation, imaging reviewed, medication reviewed, old records reviewed, HX and PE.  11/27/2023 1) patient's pulmonary status is stable and she is not even using the Symbicort inhaler prescribed from her last visit 2) patient's weight still remains a major issue for overall health. I will put in a referral to the medical weight management of [facility]. Follow-up 6 months time spent 30 minutes counseling, education, documentation, imaging reviewed, medication reviewed, inhaler demonstrated, old records reviewed, HX and PE.  6/7/24  1) clinically doing well does not use her inhaler on a regular basis  2) She had a PFT that was normal but at the lowest limits of normal  3) her lung function would improve if she could lose weight    discussed with the patient time spent 30 min  counseling, education, documentation, imaging reviewed, medication reviewed,  f/u 6mos   9/27/2024 Marsha Sadler is a 68-year-old female with history of recurrent bronchitis, asthma and sarcoidosis.  The patient had a CAT scan in May 2024 which described bronchiectasis in multiple lobes of her lungs.  The patient wanted an explanation about bronchiectasis is I explained that to her.  Patient was also told that it was probably from the venous bronchitis and or the sarcoidosis and that the injury to the bronchial tubes predisposes her to bronchial infections.  Time spent 30 minutes counseling, education, documentation, imaging reviewed, medication reviewed, inhaler demonstrated, old records reviewed, HX and PE

## 2024-09-29 NOTE — REASON FOR VISIT
[Follow-Up] : a follow-up visit [Asthma] : asthma [TextEntry] : Pt here for 3 month routine follow-up, pt states no breathing complaints currently. PT curious about what she had that she was here for back on may 9th 2022, there's a CT scan in the chart for that exact date. -NK

## 2024-10-14 ENCOUNTER — OFFICE (OUTPATIENT)
Facility: LOCATION | Age: 69
Setting detail: OPHTHALMOLOGY
End: 2024-10-14
Payer: MEDICARE

## 2024-10-14 DIAGNOSIS — H43.393: ICD-10-CM

## 2024-10-14 DIAGNOSIS — H35.3131: ICD-10-CM

## 2024-10-14 DIAGNOSIS — B30.9: ICD-10-CM

## 2024-10-14 DIAGNOSIS — H43.813: ICD-10-CM

## 2024-10-14 PROCEDURE — 92134 CPTRZ OPH DX IMG PST SGM RTA: CPT | Performed by: OPHTHALMOLOGY

## 2024-10-14 PROCEDURE — 92014 COMPRE OPH EXAM EST PT 1/>: CPT | Performed by: OPHTHALMOLOGY

## 2024-10-14 ASSESSMENT — CONFRONTATIONAL VISUAL FIELD TEST (CVF)
OS_FINDINGS: FULL
OD_FINDINGS: FULL

## 2024-10-14 ASSESSMENT — LID EXAM ASSESSMENTS
OD_BLEPHARITIS: 1+
OS_BLEPHARITIS: 1+

## 2024-10-14 ASSESSMENT — LID POSITION - DERMATOCHALASIS
OS_DERMATOCHALASIS: 1+
OD_DERMATOCHALASIS: 1+

## 2024-10-14 ASSESSMENT — CORNEAL DYSTROPHY - POSTERIOR
OS_POSTERIORDYSTROPHY: GUTTATA
OD_POSTERIORDYSTROPHY: GUTTATA

## 2024-10-16 ENCOUNTER — RX ONLY (RX ONLY)
Age: 69
End: 2024-10-16

## 2024-10-16 ENCOUNTER — OFFICE (OUTPATIENT)
Facility: LOCATION | Age: 69
Setting detail: OPHTHALMOLOGY
End: 2024-10-16
Payer: MEDICARE

## 2024-10-16 DIAGNOSIS — Z48.02: ICD-10-CM

## 2024-10-16 DIAGNOSIS — B30.9: ICD-10-CM

## 2024-10-16 PROCEDURE — 99213 OFFICE O/P EST LOW 20 MIN: CPT | Performed by: OPHTHALMOLOGY

## 2024-10-16 ASSESSMENT — CORNEAL DYSTROPHY - POSTERIOR
OS_POSTERIORDYSTROPHY: GUTTATA
OD_POSTERIORDYSTROPHY: GUTTATA

## 2024-10-16 ASSESSMENT — LID POSITION - DERMATOCHALASIS
OS_DERMATOCHALASIS: 1+
OD_DERMATOCHALASIS: 1+

## 2024-10-16 ASSESSMENT — LID EXAM ASSESSMENTS
OS_BLEPHARITIS: 1+
OD_BLEPHARITIS: 1+

## 2024-10-16 ASSESSMENT — CONFRONTATIONAL VISUAL FIELD TEST (CVF)
OD_FINDINGS: FULL
OS_FINDINGS: FULL

## 2024-10-17 PROBLEM — Z96.1 PSEUDOPHAKIA ; BOTH EYES: Status: ACTIVE | Noted: 2024-10-14

## 2024-10-17 PROBLEM — H35.3131 AGE RELATED MACULAR DEGENERATION DRY; BOTH EYES EARLY: Status: ACTIVE | Noted: 2024-10-14

## 2024-10-17 PROBLEM — H10.433 CONJUNCTIVITIS CHRONIC FOLLICULAR; BOTH EYES: Status: ACTIVE | Noted: 2024-10-14

## 2024-10-17 PROBLEM — B30.9 VIRAL CONJUNCTIVITIS ; LEFT EYE: Status: ACTIVE | Noted: 2024-10-14

## 2024-10-17 PROBLEM — Z48.02 ENCOUNTER FOR REMOVAL OF SUTURES: Status: ACTIVE | Noted: 2024-10-16

## 2024-10-17 PROBLEM — H18.513 ENDOTHELIAL CORNEAL DYSTROPHY; BOTH EYES: Status: ACTIVE | Noted: 2024-10-14

## 2024-10-17 ASSESSMENT — REFRACTION_AUTOREFRACTION
OS_SPHERE: +0.25
OD_AXIS: 22
OD_SPHERE: PLANO
OS_AXIS: 7
OS_AXIS: 7
OD_AXIS: 22
OD_CYLINDER: +0.75
OS_CYLINDER: +0.50
OD_CYLINDER: +0.75
OS_CYLINDER: +0.50
OS_SPHERE: +0.25
OD_SPHERE: PLANO

## 2024-10-17 ASSESSMENT — VISUAL ACUITY
OD_BCVA: 20/25+2
OS_BCVA: 20/30-2
OS_BCVA: 20/30+1
OD_BCVA: 20/20

## 2024-11-12 ENCOUNTER — APPOINTMENT (OUTPATIENT)
Dept: PULMONOLOGY | Facility: CLINIC | Age: 69
End: 2024-11-12
Payer: MEDICARE

## 2024-11-12 VITALS — HEIGHT: 64 IN | WEIGHT: 187 LBS | BODY MASS INDEX: 31.92 KG/M2

## 2024-11-12 VITALS
HEART RATE: 80 BPM | DIASTOLIC BLOOD PRESSURE: 66 MMHG | OXYGEN SATURATION: 95 % | SYSTOLIC BLOOD PRESSURE: 128 MMHG | TEMPERATURE: 97.7 F

## 2024-11-12 DIAGNOSIS — Z86.2 PERSONAL HISTORY OF DISEASES OF THE BLOOD AND BLOOD-FORMING ORGANS AND CERTAIN DISORDERS INVOLVING THE IMMUNE MECHANISM: ICD-10-CM

## 2024-11-12 PROCEDURE — 99214 OFFICE O/P EST MOD 30 MIN: CPT

## 2024-11-12 RX ORDER — AZITHROMYCIN 250 MG/1
250 TABLET, FILM COATED ORAL
Qty: 1 | Refills: 0 | Status: ACTIVE | COMMUNITY
Start: 2024-11-12 | End: 1900-01-01

## 2024-11-22 ENCOUNTER — APPOINTMENT (OUTPATIENT)
Dept: PULMONOLOGY | Facility: CLINIC | Age: 69
End: 2024-11-22
Payer: MEDICARE

## 2024-11-22 VITALS
SYSTOLIC BLOOD PRESSURE: 124 MMHG | HEART RATE: 110 BPM | BODY MASS INDEX: 31.92 KG/M2 | HEIGHT: 64 IN | OXYGEN SATURATION: 95 % | DIASTOLIC BLOOD PRESSURE: 72 MMHG | TEMPERATURE: 96.4 F | WEIGHT: 187 LBS

## 2024-11-22 DIAGNOSIS — R05.9 COUGH, UNSPECIFIED: ICD-10-CM

## 2024-11-22 DIAGNOSIS — J47.9 BRONCHIECTASIS, UNCOMPLICATED: ICD-10-CM

## 2024-11-22 DIAGNOSIS — J45.909 UNSPECIFIED ASTHMA, UNCOMPLICATED: ICD-10-CM

## 2024-11-22 PROCEDURE — 99214 OFFICE O/P EST MOD 30 MIN: CPT

## 2024-11-22 RX ORDER — PREDNISONE 5 MG/1
5 TABLET ORAL
Qty: 72 | Refills: 1 | Status: ACTIVE | COMMUNITY
Start: 2024-11-22 | End: 1900-01-01

## 2024-11-26 ENCOUNTER — NON-APPOINTMENT (OUTPATIENT)
Age: 69
End: 2024-11-26

## 2025-01-13 ENCOUNTER — APPOINTMENT (OUTPATIENT)
Dept: PULMONOLOGY | Facility: CLINIC | Age: 70
End: 2025-01-13
Payer: MEDICARE

## 2025-01-13 VITALS
HEART RATE: 88 BPM | SYSTOLIC BLOOD PRESSURE: 120 MMHG | HEIGHT: 64 IN | OXYGEN SATURATION: 97 % | DIASTOLIC BLOOD PRESSURE: 70 MMHG

## 2025-01-13 DIAGNOSIS — J47.9 BRONCHIECTASIS, UNCOMPLICATED: ICD-10-CM

## 2025-01-13 PROCEDURE — 99214 OFFICE O/P EST MOD 30 MIN: CPT

## 2025-01-25 ENCOUNTER — OFFICE (OUTPATIENT)
Facility: LOCATION | Age: 70
Setting detail: OPHTHALMOLOGY
End: 2025-01-25
Payer: MEDICARE

## 2025-01-25 DIAGNOSIS — H35.3131: ICD-10-CM

## 2025-01-25 DIAGNOSIS — D23.122: ICD-10-CM

## 2025-01-25 DIAGNOSIS — H04.562: ICD-10-CM

## 2025-01-25 PROBLEM — H01.00 BLEPHARITIS; RIGHT UPPER LID, LEFT UPPER LID: Status: ACTIVE | Noted: 2025-01-25

## 2025-01-25 PROCEDURE — 99213 OFFICE O/P EST LOW 20 MIN: CPT | Performed by: OPHTHALMOLOGY

## 2025-01-25 ASSESSMENT — TONOMETRY
OD_IOP_MMHG: 17
OS_IOP_MMHG: 17

## 2025-01-25 ASSESSMENT — CORNEAL DYSTROPHY - POSTERIOR
OD_POSTERIORDYSTROPHY: GUTTATA
OS_POSTERIORDYSTROPHY: GUTTATA

## 2025-01-25 ASSESSMENT — LID EXAM ASSESSMENTS
OS_PUNCTAL_STENOSIS: 1+
OS_BLEPHARITIS: 1+
OD_BLEPHARITIS: 1+

## 2025-01-25 ASSESSMENT — LID POSITION - DERMATOCHALASIS
OS_DERMATOCHALASIS: 1+
OD_DERMATOCHALASIS: 1+

## 2025-01-27 PROBLEM — H02.83 DERMATOCHALSIS ; BOTH EYES: Status: ACTIVE | Noted: 2025-01-25

## 2025-01-27 ASSESSMENT — REFRACTION_AUTOREFRACTION
OD_AXIS: 22
OD_CYLINDER: +0.75
OS_AXIS: 7
OD_SPHERE: PLANO
OS_SPHERE: +0.25
OS_CYLINDER: +0.50

## 2025-01-27 ASSESSMENT — VISUAL ACUITY
OD_BCVA: 20/20
OS_BCVA: 20/25-2

## 2025-02-11 ENCOUNTER — APPOINTMENT (OUTPATIENT)
Dept: PULMONOLOGY | Facility: CLINIC | Age: 70
End: 2025-02-11

## 2025-02-25 ENCOUNTER — APPOINTMENT (OUTPATIENT)
Dept: CT IMAGING | Facility: CLINIC | Age: 70
End: 2025-02-25
Payer: MEDICARE

## 2025-02-25 ENCOUNTER — OUTPATIENT (OUTPATIENT)
Dept: OUTPATIENT SERVICES | Facility: HOSPITAL | Age: 70
LOS: 1 days | End: 2025-02-25
Payer: MEDICARE

## 2025-02-25 DIAGNOSIS — Z00.8 ENCOUNTER FOR OTHER GENERAL EXAMINATION: ICD-10-CM

## 2025-02-25 DIAGNOSIS — R10.32 LEFT LOWER QUADRANT PAIN: ICD-10-CM

## 2025-02-25 PROCEDURE — 74176 CT ABD & PELVIS W/O CONTRAST: CPT

## 2025-02-25 PROCEDURE — 74176 CT ABD & PELVIS W/O CONTRAST: CPT | Mod: 26

## 2025-03-21 ENCOUNTER — APPOINTMENT (OUTPATIENT)
Dept: PULMONOLOGY | Facility: CLINIC | Age: 70
End: 2025-03-21

## 2025-05-27 ENCOUNTER — APPOINTMENT (OUTPATIENT)
Dept: PULMONOLOGY | Facility: CLINIC | Age: 70
End: 2025-05-27
Payer: MEDICARE

## 2025-05-27 VITALS
DIASTOLIC BLOOD PRESSURE: 70 MMHG | HEIGHT: 64 IN | HEART RATE: 72 BPM | OXYGEN SATURATION: 96 % | WEIGHT: 189 LBS | BODY MASS INDEX: 32.27 KG/M2 | SYSTOLIC BLOOD PRESSURE: 130 MMHG | TEMPERATURE: 97.1 F

## 2025-05-27 PROCEDURE — ZZZZZ: CPT

## 2025-05-27 PROCEDURE — 94010 BREATHING CAPACITY TEST: CPT

## 2025-05-27 PROCEDURE — 94727 GAS DIL/WSHOT DETER LNG VOL: CPT

## 2025-05-27 PROCEDURE — 94729 DIFFUSING CAPACITY: CPT

## 2025-05-27 PROCEDURE — 99214 OFFICE O/P EST MOD 30 MIN: CPT | Mod: 25

## 2025-05-30 ENCOUNTER — APPOINTMENT (OUTPATIENT)
Dept: RADIOLOGY | Facility: CLINIC | Age: 70
End: 2025-05-30

## 2025-05-30 ENCOUNTER — OUTPATIENT (OUTPATIENT)
Dept: OUTPATIENT SERVICES | Facility: HOSPITAL | Age: 70
LOS: 1 days | End: 2025-05-30
Payer: MEDICARE

## 2025-05-30 DIAGNOSIS — R10.31 RIGHT LOWER QUADRANT PAIN: ICD-10-CM

## 2025-05-30 PROCEDURE — 72100 X-RAY EXAM L-S SPINE 2/3 VWS: CPT | Mod: 26

## 2025-05-30 PROCEDURE — 73502 X-RAY EXAM HIP UNI 2-3 VIEWS: CPT

## 2025-05-30 PROCEDURE — 73502 X-RAY EXAM HIP UNI 2-3 VIEWS: CPT | Mod: 26,RT

## 2025-05-30 PROCEDURE — 72100 X-RAY EXAM L-S SPINE 2/3 VWS: CPT

## 2025-07-31 ENCOUNTER — OFFICE (OUTPATIENT)
Facility: LOCATION | Age: 70
Setting detail: OPHTHALMOLOGY
End: 2025-07-31
Payer: MEDICARE

## 2025-07-31 DIAGNOSIS — H10.89: ICD-10-CM

## 2025-07-31 DIAGNOSIS — S05.01XA: ICD-10-CM

## 2025-07-31 PROCEDURE — 99213 OFFICE O/P EST LOW 20 MIN: CPT | Performed by: OPHTHALMOLOGY

## 2025-07-31 ASSESSMENT — VISUAL ACUITY
OS_BCVA: 20/30
OD_BCVA: 20/20

## 2025-08-02 ENCOUNTER — OFFICE (OUTPATIENT)
Facility: LOCATION | Age: 70
Setting detail: OPHTHALMOLOGY
End: 2025-08-02
Payer: MEDICARE

## 2025-08-02 DIAGNOSIS — H02.051: ICD-10-CM

## 2025-08-02 DIAGNOSIS — H10.89: ICD-10-CM

## 2025-08-02 DIAGNOSIS — S05.01XA: ICD-10-CM

## 2025-08-02 PROBLEM — H11.821 CONJUNCTIVOCHALASIS; RIGHT EYE: Status: ACTIVE | Noted: 2025-07-31

## 2025-08-02 PROBLEM — H11.151 PINGUECULA;  , RIGHT EYE: Status: ACTIVE | Noted: 2025-07-31

## 2025-08-02 PROBLEM — H10.431 CONJUNCTIVITIS CHRONIC FOLLICULAR;  , RIGHT EYE: Status: ACTIVE | Noted: 2025-07-31

## 2025-08-02 PROBLEM — H18.511 ENDOTHELIAL CORNEAL DYSTROPHY;  , RIGHT EYE: Status: ACTIVE | Noted: 2025-07-31

## 2025-08-02 PROCEDURE — 67820 REVISE EYELASHES: CPT | Mod: E3 | Performed by: OPTOMETRIST

## 2025-08-02 PROCEDURE — 99213 OFFICE O/P EST LOW 20 MIN: CPT | Mod: 25 | Performed by: OPTOMETRIST

## 2025-08-02 ASSESSMENT — LID EXAM ASSESSMENTS
OS_PUNCTAL_STENOSIS: 1+
OD_TRICHIASIS: RUL
OD_BLEPHARITIS: 1+
OS_BLEPHARITIS: 1+

## 2025-08-02 ASSESSMENT — CORNEAL DYSTROPHY - POSTERIOR
OS_POSTERIORDYSTROPHY: GUTTATA
OD_POSTERIORDYSTROPHY: GUTTATA

## 2025-08-02 ASSESSMENT — CONFRONTATIONAL VISUAL FIELD TEST (CVF)
OD_FINDINGS: FULL
OS_FINDINGS: FULL

## 2025-08-02 ASSESSMENT — LID POSITION - DERMATOCHALASIS
OD_DERMATOCHALASIS: 1+
OS_DERMATOCHALASIS: 1+

## 2025-08-02 ASSESSMENT — CORNEAL TRAUMA - ABRASION: OD_ABRASION: PRESENT

## 2025-08-04 ASSESSMENT — REFRACTION_AUTOREFRACTION
OS_AXIS: 7
OS_SPHERE: +0.25
OD_SPHERE: PLANO
OS_CYLINDER: +0.50
OD_CYLINDER: +0.75
OD_AXIS: 22

## 2025-08-04 ASSESSMENT — VISUAL ACUITY
OD_BCVA: 20/20-1
OS_BCVA: 20/30